# Patient Record
Sex: MALE | Race: ASIAN | NOT HISPANIC OR LATINO | Employment: FULL TIME | URBAN - METROPOLITAN AREA
[De-identification: names, ages, dates, MRNs, and addresses within clinical notes are randomized per-mention and may not be internally consistent; named-entity substitution may affect disease eponyms.]

---

## 2023-09-28 ENCOUNTER — OFFICE VISIT (OUTPATIENT)
Dept: URGENT CARE | Facility: CLINIC | Age: 43
End: 2023-09-28

## 2023-09-28 VITALS
WEIGHT: 147 LBS | OXYGEN SATURATION: 98 % | RESPIRATION RATE: 16 BRPM | TEMPERATURE: 97.1 F | BODY MASS INDEX: 23.63 KG/M2 | SYSTOLIC BLOOD PRESSURE: 112 MMHG | DIASTOLIC BLOOD PRESSURE: 68 MMHG | HEART RATE: 78 BPM | HEIGHT: 66 IN

## 2023-09-28 DIAGNOSIS — T78.40XA ALLERGIC REACTION, INITIAL ENCOUNTER: ICD-10-CM

## 2023-09-28 DIAGNOSIS — R21 RASH AND NONSPECIFIC SKIN ERUPTION: ICD-10-CM

## 2023-09-28 PROCEDURE — 3074F SYST BP LT 130 MM HG: CPT | Performed by: PHYSICIAN ASSISTANT

## 2023-09-28 PROCEDURE — 99203 OFFICE O/P NEW LOW 30 MIN: CPT | Performed by: PHYSICIAN ASSISTANT

## 2023-09-28 PROCEDURE — 3078F DIAST BP <80 MM HG: CPT | Performed by: PHYSICIAN ASSISTANT

## 2023-09-28 RX ORDER — PREDNISONE 20 MG/1
TABLET ORAL
Qty: 5 TABLET | Refills: 0 | Status: SHIPPED | OUTPATIENT
Start: 2023-09-28

## 2023-09-28 RX ORDER — BENAZEPRIL HYDROCHLORIDE 10 MG/1
10 TABLET ORAL DAILY
COMMUNITY

## 2023-09-28 ASSESSMENT — ENCOUNTER SYMPTOMS
STRIDOR: 0
SHORTNESS OF BREATH: 0
FEVER: 0
VOMITING: 0
CHILLS: 0
NAUSEA: 0

## 2023-09-28 NOTE — PROGRESS NOTES
"  Subjective:     Ras Montes De Oca  is a 42 y.o. male who presents for Rash (X1 week Groin rash and back of the body)    Family members assisting with translation.  Rash  Pertinent negatives include no fever, shortness of breath or vomiting.   Patient presents urgent care with family members assisting with translation.  They note that he has had a rash has been present for the last week.  Patient notes intense itching with rash.  States rash is on her groin as well as torso and legs.  Patient states that first began on right side of back.  Denies swelling to oropharynx or throat.  Denies abnormal breathing or coughing.  Denies history of anaphylaxis or angioedema.  They have been treating with Benadryl with moderate improvement but notes rash persists.  They deny new exposures or new medicines.  They state patient works for [employer] but denies anything that is necessarily coming to contact with these areas of his body.  He states he has become congested and had sneezing while at work.    Review of Systems   Constitutional:  Negative for chills and fever.   Respiratory:  Negative for shortness of breath and stridor.    Gastrointestinal:  Negative for nausea and vomiting.   Skin:  Positive for itching and rash.       Medications:    benazepril Tabs    Allergies: Patient has no allergy information on record.    Problem List: Ras Montes De Oca does not have a problem list on file.    Surgical History:  No past surgical history on file.    Past Social Hx: Ras Montes De Oca       Past Family Hx:  Ras Montes De Oca family history is not on file.     Problem list, medications, and allergies reviewed by myself today in Epic.     Objective:   /68 (BP Location: Left arm, Patient Position: Standing, BP Cuff Size: Adult)   Pulse 78   Temp 36.2 °C (97.1 °F)   Resp 16   Ht 1.676 m (5' 6\")   Wt 66.7 kg (147 lb)   SpO2 98%   BMI 23.73 kg/m²     Physical Exam  Vitals and nursing note reviewed.   Constitutional:       General: He is " not in acute distress.     Appearance: Normal appearance. He is well-developed. He is not diaphoretic.   HENT:      Head: Normocephalic and atraumatic.      Right Ear: External ear normal.      Left Ear: External ear normal.      Nose: Nose normal.      Mouth/Throat:      Lips: Pink.      Mouth: Mucous membranes are moist.      Dentition: No gingival swelling.      Pharynx: Oropharynx is clear. Uvula midline. No pharyngeal swelling, oropharyngeal exudate, posterior oropharyngeal erythema or uvula swelling.   Eyes:      General: No scleral icterus.        Right eye: No discharge.         Left eye: No discharge.      Conjunctiva/sclera: Conjunctivae normal.   Pulmonary:      Effort: Pulmonary effort is normal. No respiratory distress.   Musculoskeletal:         General: Normal range of motion.      Cervical back: Neck supple.   Skin:     General: Skin is warm and dry.      Coloration: Skin is not pale.      Findings: Rash present. No erythema. Rash is urticarial. Rash is not scaling or vesicular.   Neurological:      Mental Status: He is alert and oriented to person, place, and time.      Coordination: Coordination normal.         Assessment/Plan:   Assessment      1. Rash and nonspecific skin eruption    2. Allergic reaction, initial encounter  - predniSONE (DELTASONE) 20 MG Tab; Take one tab PO qd x 5d  Dispense: 5 Tablet; Refill: 0    Other orders  - benazepril (LOTENSIN) 10 MG Tab; Take 10 mg by mouth every day.    Supportive care is reviewed with patient/caregiver - recommend to push PO fluids and electrolytes, Cautioned regarding potential side effects of steroid, avoid nsaids while using  Over-the-counter Benadryl and Pepcid  Declines allergist referral at this time  Sent with work note  Return to clinic with lack of resolution or progression of symptoms.  ER precautions with any worsening symptoms are reviewed with patient/caregiver and they do express understanding    I have worn an N95 mask, gloves and eye  protection for the entire encounter with this patient.     Differential diagnosis, natural history, supportive care, and indications for immediate follow-up discussed.

## 2023-09-28 NOTE — LETTER
[facility]  [address] [address]     September 28, 2023    Patient: Ras Montes De Oca   YOB: 1980   Date of Visit: 9/28/2023       To Whom It May Concern:    Ras Montes De Oca was seen and treated in our department on 9/28/2023. He should be excused from missed work for today and tomorrow.     Sincerely,     Brandon Hines P.A.-C.

## 2024-06-22 ENCOUNTER — OFFICE VISIT (OUTPATIENT)
Dept: URGENT CARE | Facility: CLINIC | Age: 44
End: 2024-06-22
Payer: COMMERCIAL

## 2024-06-22 VITALS
BODY MASS INDEX: 23.18 KG/M2 | HEART RATE: 60 BPM | HEIGHT: 67 IN | DIASTOLIC BLOOD PRESSURE: 70 MMHG | SYSTOLIC BLOOD PRESSURE: 102 MMHG | RESPIRATION RATE: 16 BRPM | WEIGHT: 147.71 LBS | OXYGEN SATURATION: 100 % | TEMPERATURE: 98.1 F

## 2024-06-22 DIAGNOSIS — L30.4 INTERTRIGO: ICD-10-CM

## 2024-06-22 PROCEDURE — 99213 OFFICE O/P EST LOW 20 MIN: CPT | Performed by: FAMILY MEDICINE

## 2024-06-22 PROCEDURE — 3074F SYST BP LT 130 MM HG: CPT | Performed by: FAMILY MEDICINE

## 2024-06-22 PROCEDURE — 3078F DIAST BP <80 MM HG: CPT | Performed by: FAMILY MEDICINE

## 2024-06-22 RX ORDER — FLUCONAZOLE 150 MG/1
150 TABLET ORAL
Qty: 4 TABLET | Refills: 1 | Status: SHIPPED | OUTPATIENT
Start: 2024-06-22 | End: 2024-07-22

## 2024-06-22 RX ORDER — TRIAMCINOLONE ACETONIDE 1 MG/G
OINTMENT TOPICAL
Qty: 30 G | Refills: 0 | Status: SHIPPED | OUTPATIENT
Start: 2024-06-22

## 2024-06-22 ASSESSMENT — ENCOUNTER SYMPTOMS
WEIGHT LOSS: 0
EYE REDNESS: 0
VOMITING: 0
NAUSEA: 0
EYE DISCHARGE: 0
MYALGIAS: 0

## 2024-06-22 NOTE — PROGRESS NOTES
"Brennan Montes De Oca is a 43 y.o. male who presents with Rash (X8mo, rash on and off on groin area)            8 months itching rash to groin.  No blisters.  No drainage.  Symptoms have waxed and waned.  No genital lesions.  No oral lesions.  No scalp lesions.  No other aggravating or alleviating factors.        Review of Systems   Constitutional:  Negative for malaise/fatigue and weight loss.   Eyes:  Negative for discharge and redness.   Gastrointestinal:  Negative for nausea and vomiting.   Musculoskeletal:  Negative for joint pain and myalgias.              Objective     /70 (BP Location: Left arm, Patient Position: Sitting, BP Cuff Size: Adult)   Pulse 60   Temp 36.7 °C (98.1 °F) (Temporal)   Resp 16   Ht 1.702 m (5' 7\")   Wt 67 kg (147 lb 11.3 oz)   SpO2 100%   BMI 23.13 kg/m²      Physical Exam  Constitutional:       Appearance: Normal appearance.   Skin:     Findings: Rash (Pruritic plaques to bilateral groin.  Genitalia are spared.  No vesicles.  Few areas appear to have central clearing.  No satellite lesions.  No scale.  No drainage.) present.   Neurological:      Mental Status: He is alert.                             Assessment & Plan        1. Intertrigo    - fluconazole (DIFLUCAN) 150 MG tablet; Take 1 Tablet by mouth every 7 days for 30 days.  Dispense: 4 Tablet; Refill: 1  - terbinafine (LAMISIL) 1 % cream; Apply to affected area twice daily until clear  Dispense: 28.4 g; Refill: 0  - triamcinolone acetonide (KENALOG) 0.1 % Ointment; Apply thin layer to affected area twice daily as needed  Dispense: 30 g; Refill: 0    Differential diagnosis, natural history, supportive care, and indications for immediate follow-up were discussed.                   "

## 2024-06-22 NOTE — LETTER
June 22, 2024         Patient: Ras Montes De Oca   YOB: 1980   Date of Visit: 6/22/2024           To Whom it May Concern:    Ras Montes De Oca was seen in my clinic on 6/22/2024. Please excuse from work 6/24/2024.     Sincerely,           Bhavin Estrada M.D.  Electronically Signed

## 2024-08-07 ENCOUNTER — OFFICE VISIT (OUTPATIENT)
Dept: MEDICAL GROUP | Facility: PHYSICIAN GROUP | Age: 44
End: 2024-08-07
Payer: COMMERCIAL

## 2024-08-07 ENCOUNTER — HOSPITAL ENCOUNTER (OUTPATIENT)
Facility: MEDICAL CENTER | Age: 44
End: 2024-08-07
Attending: FAMILY MEDICINE
Payer: COMMERCIAL

## 2024-08-07 VITALS
DIASTOLIC BLOOD PRESSURE: 64 MMHG | WEIGHT: 146 LBS | TEMPERATURE: 98.1 F | OXYGEN SATURATION: 98 % | SYSTOLIC BLOOD PRESSURE: 102 MMHG | RESPIRATION RATE: 18 BRPM | BODY MASS INDEX: 22.91 KG/M2 | HEIGHT: 67 IN | HEART RATE: 56 BPM

## 2024-08-07 DIAGNOSIS — L30.4 INTERTRIGO: ICD-10-CM

## 2024-08-07 DIAGNOSIS — R31.29 OTHER MICROSCOPIC HEMATURIA: ICD-10-CM

## 2024-08-07 DIAGNOSIS — M89.8X1 PAIN OF LEFT SCAPULA: ICD-10-CM

## 2024-08-07 DIAGNOSIS — Z00.00 WELLNESS EXAMINATION: ICD-10-CM

## 2024-08-07 DIAGNOSIS — R35.0 FREQUENT URINATION: ICD-10-CM

## 2024-08-07 DIAGNOSIS — M54.6 ACUTE LEFT-SIDED THORACIC BACK PAIN: ICD-10-CM

## 2024-08-07 DIAGNOSIS — R20.2 PARESTHESIAS: ICD-10-CM

## 2024-08-07 LAB
APPEARANCE UR: CLEAR
APPEARANCE UR: CLEAR
BILIRUB UR QL STRIP.AUTO: NEGATIVE
BILIRUB UR STRIP-MCNC: NEGATIVE MG/DL
COLOR UR AUTO: YELLOW
COLOR UR: YELLOW
GLUCOSE UR STRIP.AUTO-MCNC: NEGATIVE MG/DL
GLUCOSE UR STRIP.AUTO-MCNC: NEGATIVE MG/DL
KETONES UR STRIP.AUTO-MCNC: NEGATIVE MG/DL
KETONES UR STRIP.AUTO-MCNC: NEGATIVE MG/DL
LEUKOCYTE ESTERASE UR QL STRIP.AUTO: NEGATIVE
LEUKOCYTE ESTERASE UR QL STRIP.AUTO: NEGATIVE
MICRO URNS: NORMAL
NITRITE UR QL STRIP.AUTO: NEGATIVE
NITRITE UR QL STRIP.AUTO: NEGATIVE
PH UR STRIP.AUTO: 6.5 [PH] (ref 5–8)
PH UR STRIP.AUTO: 6.5 [PH] (ref 5–8)
PROT UR QL STRIP: NEGATIVE MG/DL
PROT UR QL STRIP: NEGATIVE MG/DL
RBC UR QL AUTO: NEGATIVE
RBC UR QL AUTO: NORMAL
SP GR UR STRIP.AUTO: 1
SP GR UR STRIP.AUTO: 1.01
UROBILINOGEN UR STRIP-MCNC: NORMAL MG/DL
UROBILINOGEN UR STRIP.AUTO-MCNC: 0.2 MG/DL

## 2024-08-07 PROCEDURE — 99214 OFFICE O/P EST MOD 30 MIN: CPT | Performed by: FAMILY MEDICINE

## 2024-08-07 PROCEDURE — 3074F SYST BP LT 130 MM HG: CPT | Performed by: FAMILY MEDICINE

## 2024-08-07 PROCEDURE — 3078F DIAST BP <80 MM HG: CPT | Performed by: FAMILY MEDICINE

## 2024-08-07 PROCEDURE — 81003 URINALYSIS AUTO W/O SCOPE: CPT

## 2024-08-07 PROCEDURE — 81002 URINALYSIS NONAUTO W/O SCOPE: CPT | Performed by: FAMILY MEDICINE

## 2024-08-07 PROCEDURE — 87086 URINE CULTURE/COLONY COUNT: CPT

## 2024-08-07 RX ORDER — TRIAMCINOLONE ACETONIDE 1 MG/G
OINTMENT TOPICAL
Qty: 30 G | Refills: 0 | Status: SHIPPED | OUTPATIENT
Start: 2024-08-07

## 2024-08-07 RX ORDER — MELOXICAM 15 MG/1
15 TABLET ORAL DAILY
Qty: 90 TABLET | Refills: 0 | Status: SHIPPED | OUTPATIENT
Start: 2024-08-07

## 2024-08-07 ASSESSMENT — ENCOUNTER SYMPTOMS
NAUSEA: 0
PALPITATIONS: 0
ABDOMINAL PAIN: 0
SHORTNESS OF BREATH: 0
VOMITING: 0

## 2024-08-07 ASSESSMENT — PATIENT HEALTH QUESTIONNAIRE - PHQ9: CLINICAL INTERPRETATION OF PHQ2 SCORE: 0

## 2024-08-07 NOTE — PROGRESS NOTES
"Subjective:     Verbal consent was acquired by the patient to use [employer] ambient listening note generation during this visit      CC:   Chief Complaint   Patient presents with    Establish Care    Requesting Labs     Pt would like to check for diabetes, pt with tingling sensation of both arms and legs and frequent urination  x 1 mo.  Fam HX: Diabetes    Back Pain     Lt. Upper mid back pain x 1 mo.  Pain worsens when lifts heavy objects.         History of Present Illness  The patient presents for evaluation of back pain and increased urination. He is accompanied by an adult female.    He suspects he may have diabetes due to a family history of diabetes. He reports increased urination and numbness and tingling in his feet and hands for the past month. He denies any sudden weight changes. He also experiences back pain, particularly on the left side, when carrying heavy objects, which extends to his left shoulder. This back pain has been present for approximately one to two months. The pain is most noticeable when he is lying in bed, but manageable when he is at work. His daughter has been massaging the area, but he has not used ice or heating pads. He has not taken Tylenol or ibuprofen. The pain occasionally radiates down his arms, disrupting his sleep. He has no history of surgeries. He denies any recent weight changes.    He has a history of allergies, for which he received medication at an urgent care facility eight months ago and would like it refilled    FAMILY HISTORY  He has a family history of diabetes.       ROS:  Review of Systems   Respiratory:  Negative for shortness of breath.    Cardiovascular:  Negative for chest pain and palpitations.   Gastrointestinal:  Negative for abdominal pain, nausea and vomiting.       Objective:     Exam:  /64 (BP Location: Left arm, Patient Position: Sitting, BP Cuff Size: Adult)   Pulse (!) 56   Temp 36.7 °C (98.1 °F) (Temporal)   Resp 18   Ht 1.702 m (5' 7\") "   Wt 66.2 kg (146 lb)   SpO2 98%   BMI 22.87 kg/m²  Body mass index is 22.87 kg/m².    Physical Exam  Constitutional:       Appearance: Normal appearance.   HENT:      Head: Normocephalic and atraumatic.      Mouth/Throat:      Mouth: Mucous membranes are moist.   Eyes:      Extraocular Movements: Extraocular movements intact.      Conjunctiva/sclera: Conjunctivae normal.      Pupils: Pupils are equal, round, and reactive to light.   Cardiovascular:      Rate and Rhythm: Normal rate and regular rhythm.      Heart sounds: No murmur heard.  Pulmonary:      Effort: Pulmonary effort is normal.      Breath sounds: Normal breath sounds. No wheezing.   Abdominal:      General: Abdomen is flat. Bowel sounds are normal.      Palpations: Abdomen is soft.   Musculoskeletal:      Left shoulder: No tenderness or crepitus. Normal range of motion. Normal strength.      Cervical back: Neck supple. No spasms or tenderness. No pain with movement. Normal range of motion.      Thoracic back: Spasms and tenderness present. Normal range of motion.        Back:    Skin:     General: Skin is warm and dry.   Neurological:      General: No focal deficit present.      Mental Status: He is alert and oriented to person, place, and time.   Psychiatric:         Mood and Affect: Mood normal.             Assessment & Plan:     43 y.o. male with the following -    1. Frequent urination  7. Other microscopic hematuria  - POCT Urinalysis  - PROSTATE SPECIFIC AG SCREENING; Future  - URINALYSIS; Future  - URINE CULTURE(NEW); Future    Acute  Etiology unclear as there is no signs of infection or elevated glucose levels and ensuring that he does not have some microscopic hematuria  Will order additional testing and then depending on those results may repeat in-house UA at his next visit in 6 weeks    2. Paresthesias  - TSH WITH REFLEX TO FT4; Future  - VITAMIN B12; Future  - Monoclonal Protein Study; Future  - DX-CERVICAL SPINE-2 OR 3 VIEWS; Future  -  DX-THORACIC SPINE-2 VIEWS; Future  - DX-SCAPULA LEFT; Future  - meloxicam (MOBIC) 15 MG tablet; Take 1 Tablet by mouth every day.  Dispense: 90 Tablet; Refill: 0  - Referral to Physical Therapy    Acute  Etiology unclear  Will order lab work and imaging    3. Acute left-sided thoracic back pain  4. Pain of left scapula  - DX-CERVICAL SPINE-2 OR 3 VIEWS; Future  - DX-THORACIC SPINE-2 VIEWS; Future  - DX-SCAPULA LEFT; Future  - meloxicam (MOBIC) 15 MG tablet; Take 1 Tablet by mouth every day.  Dispense: 90 Tablet; Refill: 0  - Referral to Physical Therapy  - tizanidine (ZANAFLEX) 4 MG Tab; Take 1 Tablet by mouth every 6 hours as needed (pain).  Dispense: 30 Tablet; Refill: 3    Acute  Etiology unclear but suspect possible osteoarthritis in either of his cervical spine or thoracic spine  Will order x-rays and placed physical therapy referral  Will start patient on meloxicam daily and tizanidine nightly as needed for pain    5. Wellness examination  - Lipid Profile; Future  - INSULIN FASTING; Future  - TSH WITH REFLEX TO FT4; Future  - CBC WITH DIFFERENTIAL; Future  - Comp Metabolic Panel; Future  - HEMOGLOBIN A1C; Future  - PROSTATE SPECIFIC AG SCREENING; Future  - HEP B SURFACE AB; Future  - HEP C VIRUS ANTIBODY; Future  - HIV AG/AB COMBO ASSAY SCREENING; Future    Patient due  Will order lab work and bring patient back at a later date for an annual exam    6. Intertrigo  - triamcinolone acetonide (KENALOG) 0.1 % Ointment; Apply thin layer to affected area twice daily as needed  Dispense: 30 g; Refill: 0    Patient requesting refill  -will send today        Return in about 6 weeks (around 9/18/2024) for Lab F/U.      I personally reviewed and updated and reviewed the patient's personal, social, family and surgical history at today's appointment.     Please note that this dictation was created using voice recognition software. I have made every reasonable attempt to correct obvious errors, but I expect that there are  errors of grammar and possibly content that I did not discover before finalizing the note.

## 2024-08-07 NOTE — LETTER
[facility]  Nadia Gardiner M.D.  [address]  [place] [address]  Fax: [phone]   Authorization for Release/Disclosure of   Protected Health Information   Name: RAS BRADLEY : 1980 SSN: [id number]   Address: [facility] [address]  [place] [address] Phone:    [phone] (home)    I authorize the entity listed below to release/disclose the PHI below to:   [facility]/Nadia Gardiner M.D. and Nadia Gardiner M.D.   Provider or Entity Name:     Address   City, State, Zip   Phone:    Fax:   Reason for request: continuity of care   Information to be released:    [  ] LAST COLONOSCOPY,  including any PATH REPORT and follow-up  [  ] LAST FIT/COLOGUARD RESULT [  ] LAST DEXA  [  ] LAST MAMMOGRAM  [  ] LAST PAP  [  ] LAST LABS [  ] RETINA EXAM REPORT  [  ] IMMUNIZATION RECORDS  [  ] Release all info      [  ] Check here and initial the line next to each item to release ALL health information INCLUDING  _____ Care and treatment for drug and / or alcohol abuse  _____ HIV testing, infection status, or AIDS  _____ Genetic Testing    DATES OF SERVICE OR TIME PERIOD TO BE DISCLOSED: _____________  I understand and acknowledge that:  * This Authorization may be revoked at any time by you in writing, except if your health information has already been used or disclosed.  * Your health information that will be used or disclosed as a result of you signing this authorization could be re-disclosed by the recipient. If this occurs, your re-disclosed health information may no longer be protected by State or Federal laws.  * You may refuse to sign this Authorization. Your refusal will not affect your ability to obtain treatment.  * This Authorization becomes effective upon signing and will  on (date) __________.      If no date is indicated, this Authorization will  one (1) year from the signature date.    Name: Ras Bradley  Signature: Date:        PLEASE FAX REQUESTED RECORDS BACK TO: [phone]  [phone]

## 2024-08-07 NOTE — LETTER
[address]  [facility]  [address]  [address]  [place] [address]     August 7, 2024    Patient: Ras Montes De Oca   YOB: 1980   Date of Visit: 8/7/2024       To Whom It May Concern:    Ras Montes De Oca was seen and treated in our department on 8/7/2024.     Sincerely,     Nadia Gardiner M.D.

## 2024-08-09 LAB
BACTERIA UR CULT: NORMAL
SIGNIFICANT IND 70042: NORMAL
SITE SITE: NORMAL
SOURCE SOURCE: NORMAL

## 2024-08-14 ENCOUNTER — TELEPHONE (OUTPATIENT)
Dept: MEDICAL GROUP | Facility: PHYSICIAN GROUP | Age: 44
End: 2024-08-14
Payer: COMMERCIAL

## 2024-08-14 NOTE — TELEPHONE ENCOUNTER
----- Message from Physician Nadia Gardiner M.D. sent at 8/8/2024 12:48 PM PDT -----  Can we let him know that his additional testing was normal and that the follow up urine did not show any blood in his urine.

## 2024-08-14 NOTE — TELEPHONE ENCOUNTER
Nadia Gardiner M.D.  [facility] [address] [facility]  Can we let him know that his additional testing was normal and that the follow up urine did not show any blood in his urine.   Associated Results    URINE CULTURE(NEW)  Order: [id number]   Status: Final result       Visible to patient: No (inaccessible in MyChart)       Dx: Frequent urination; Other microscopic...    Specimen Information: Urine, Clean Catch   1 Result Note      Component 7 d ago   Significant Indicator NEG   Source UR   Site URINE, CLEAN CATCH   Culture Result No growth at 48 hours.   Resulting Agency [facility]              Specimen Collected: 08/07/24  7:32 AM Last Resulted: 08/09/24 11:42 AM        Lab Flowsheet        Order Details        View Encounter        Lab and Collection Details        Routing        Result History     View All Conversations on this Encounter           Result Care Coordination      Result Notes     Nadia Gardiner M.D.  8/8/2024 12:48 PM PDT Back to Top      Can we let him know that his additional testing was normal and that the follow up urine did not show any blood in his urine.     Patient Communication

## 2024-08-17 NOTE — TELEPHONE ENCOUNTER
Phone Number Called: [phone] (home)     Call outcome: Spoke to patient regarding message below.    Message: Relayed results to pt - pt has limited English, relayed results in Tagalog. Pt is aware. Closing enc

## 2024-10-01 ENCOUNTER — OFFICE VISIT (OUTPATIENT)
Dept: URGENT CARE | Facility: CLINIC | Age: 44
End: 2024-10-01
Payer: COMMERCIAL

## 2024-10-01 VITALS
OXYGEN SATURATION: 100 % | SYSTOLIC BLOOD PRESSURE: 115 MMHG | RESPIRATION RATE: 16 BRPM | TEMPERATURE: 97.7 F | WEIGHT: 149.8 LBS | HEIGHT: 67 IN | HEART RATE: 67 BPM | BODY MASS INDEX: 23.51 KG/M2 | DIASTOLIC BLOOD PRESSURE: 70 MMHG

## 2024-10-01 DIAGNOSIS — S66.911A STRAIN OF RIGHT WRIST, INITIAL ENCOUNTER: ICD-10-CM

## 2024-10-01 DIAGNOSIS — M77.8 TENDONITIS OF WRIST, RIGHT: Primary | ICD-10-CM

## 2024-10-01 PROCEDURE — 99214 OFFICE O/P EST MOD 30 MIN: CPT | Performed by: PHYSICIAN ASSISTANT

## 2024-10-01 PROCEDURE — 3078F DIAST BP <80 MM HG: CPT | Performed by: PHYSICIAN ASSISTANT

## 2024-10-01 PROCEDURE — 3074F SYST BP LT 130 MM HG: CPT | Performed by: PHYSICIAN ASSISTANT

## 2024-10-01 RX ORDER — METHYLPREDNISOLONE 4 MG/1
TABLET ORAL
Qty: 21 TABLET | Refills: 0 | Status: SHIPPED | OUTPATIENT
Start: 2024-10-01

## 2024-10-01 ASSESSMENT — ENCOUNTER SYMPTOMS
NUMBNESS: 0
TINGLING: 0
JOINT SWELLING: 1
ARTHRALGIAS: 1
SENSORY CHANGE: 0

## 2024-10-02 ENCOUNTER — OCCUPATIONAL MEDICINE (OUTPATIENT)
Dept: URGENT CARE | Facility: CLINIC | Age: 44
End: 2024-10-02
Payer: COMMERCIAL

## 2024-10-02 VITALS
HEART RATE: 66 BPM | TEMPERATURE: 98.3 F | WEIGHT: 149 LBS | BODY MASS INDEX: 23.39 KG/M2 | HEIGHT: 67 IN | DIASTOLIC BLOOD PRESSURE: 74 MMHG | RESPIRATION RATE: 14 BRPM | SYSTOLIC BLOOD PRESSURE: 122 MMHG | OXYGEN SATURATION: 98 %

## 2024-10-02 DIAGNOSIS — M77.8 TENDONITIS OF WRIST, RIGHT: ICD-10-CM

## 2024-10-02 DIAGNOSIS — S66.911A STRAIN OF RIGHT WRIST, INITIAL ENCOUNTER: ICD-10-CM

## 2024-10-02 PROCEDURE — 99213 OFFICE O/P EST LOW 20 MIN: CPT | Performed by: PHYSICIAN ASSISTANT

## 2024-10-02 PROCEDURE — 3074F SYST BP LT 130 MM HG: CPT | Performed by: PHYSICIAN ASSISTANT

## 2024-10-02 PROCEDURE — 3078F DIAST BP <80 MM HG: CPT | Performed by: PHYSICIAN ASSISTANT

## 2024-10-05 ASSESSMENT — ENCOUNTER SYMPTOMS
SENSORY CHANGE: 0
FOCAL WEAKNESS: 0
TINGLING: 0

## 2024-10-06 ENCOUNTER — OCCUPATIONAL MEDICINE (OUTPATIENT)
Dept: URGENT CARE | Facility: CLINIC | Age: 44
End: 2024-10-06
Payer: COMMERCIAL

## 2024-10-06 VITALS
BODY MASS INDEX: 23.34 KG/M2 | OXYGEN SATURATION: 98 % | SYSTOLIC BLOOD PRESSURE: 110 MMHG | DIASTOLIC BLOOD PRESSURE: 60 MMHG | WEIGHT: 149 LBS | HEART RATE: 78 BPM | TEMPERATURE: 98.4 F | RESPIRATION RATE: 16 BRPM

## 2024-10-06 DIAGNOSIS — S66.911D STRAIN OF RIGHT WRIST, SUBSEQUENT ENCOUNTER: ICD-10-CM
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
## 2024-10-06 PROCEDURE — 99215 OFFICE O/P EST HI 40 MIN: CPT

## 2024-10-06 PROCEDURE — 3074F SYST BP LT 130 MM HG: CPT

## 2024-10-06 PROCEDURE — 3078F DIAST BP <80 MM HG: CPT

## 2024-10-11 ENCOUNTER — OCCUPATIONAL MEDICINE (OUTPATIENT)
Dept: URGENT CARE | Facility: CLINIC | Age: 44
End: 2024-10-11
Payer: COMMERCIAL

## 2024-10-11 VITALS
RESPIRATION RATE: 14 BRPM | WEIGHT: 149 LBS | SYSTOLIC BLOOD PRESSURE: 122 MMHG | BODY MASS INDEX: 23.39 KG/M2 | TEMPERATURE: 98.2 F | DIASTOLIC BLOOD PRESSURE: 74 MMHG | HEART RATE: 56 BPM | OXYGEN SATURATION: 98 % | HEIGHT: 67 IN

## 2024-10-11 DIAGNOSIS — S66.911D STRAIN OF RIGHT WRIST, SUBSEQUENT ENCOUNTER: ICD-10-CM

## 2024-10-11 PROCEDURE — 3078F DIAST BP <80 MM HG: CPT | Performed by: FAMILY MEDICINE

## 2024-10-11 PROCEDURE — 99213 OFFICE O/P EST LOW 20 MIN: CPT | Performed by: FAMILY MEDICINE

## 2024-10-11 PROCEDURE — 3074F SYST BP LT 130 MM HG: CPT | Performed by: FAMILY MEDICINE

## 2024-11-02 DIAGNOSIS — M54.6 ACUTE LEFT-SIDED THORACIC BACK PAIN: ICD-10-CM

## 2024-11-02 DIAGNOSIS — M89.8X1 PAIN OF LEFT SCAPULA: ICD-10-CM

## 2024-11-02 DIAGNOSIS — R20.2 PARESTHESIAS: ICD-10-CM

## 2024-11-04 RX ORDER — MELOXICAM 15 MG/1
15 TABLET ORAL DAILY
Qty: 90 TABLET | Refills: 0 | Status: SHIPPED | OUTPATIENT
Start: 2024-11-04

## 2024-11-04 NOTE — TELEPHONE ENCOUNTER
Received request via: Pharmacy    Was the patient seen in the last year in this department? Yes    Does the patient have an active prescription (recently filled or refills available) for medication(s) requested? No    Pharmacy Name:  [facility]     Does the patient have snf Plus and need 100-day supply? (This applies to ALL medications) Patient does not have SCP

## 2024-12-01 ENCOUNTER — OFFICE VISIT (OUTPATIENT)
Dept: URGENT CARE | Facility: CLINIC | Age: 44
End: 2024-12-01
Payer: COMMERCIAL

## 2024-12-01 ENCOUNTER — HOSPITAL ENCOUNTER (OUTPATIENT)
Dept: RADIOLOGY | Facility: MEDICAL CENTER | Age: 44
End: 2024-12-01
Attending: PHYSICIAN ASSISTANT
Payer: COMMERCIAL

## 2024-12-01 VITALS
DIASTOLIC BLOOD PRESSURE: 70 MMHG | HEIGHT: 66 IN | WEIGHT: 152 LBS | TEMPERATURE: 97.4 F | SYSTOLIC BLOOD PRESSURE: 110 MMHG | BODY MASS INDEX: 24.43 KG/M2 | RESPIRATION RATE: 16 BRPM | HEART RATE: 68 BPM | OXYGEN SATURATION: 100 %

## 2024-12-01 DIAGNOSIS — R10.30 LOWER ABDOMINAL PAIN: ICD-10-CM

## 2024-12-01 LAB
APPEARANCE UR: CLEAR
BILIRUB UR STRIP-MCNC: NEGATIVE MG/DL
COLOR UR AUTO: YELLOW
GLUCOSE UR STRIP.AUTO-MCNC: NEGATIVE MG/DL
KETONES UR STRIP.AUTO-MCNC: NEGATIVE MG/DL
LEUKOCYTE ESTERASE UR QL STRIP.AUTO: NEGATIVE
NITRITE UR QL STRIP.AUTO: NEGATIVE
PH UR STRIP.AUTO: 5.5 [PH] (ref 5–8)
PROT UR QL STRIP: NEGATIVE MG/DL
RBC UR QL AUTO: NORMAL
SP GR UR STRIP.AUTO: 1.01
UROBILINOGEN UR STRIP-MCNC: 0.2 MG/DL

## 2024-12-01 PROCEDURE — 74177 CT ABD & PELVIS W/CONTRAST: CPT

## 2024-12-01 PROCEDURE — 700117 HCHG RX CONTRAST REV CODE 255: Performed by: PHYSICIAN ASSISTANT

## 2024-12-01 PROCEDURE — 3078F DIAST BP <80 MM HG: CPT | Performed by: PHYSICIAN ASSISTANT

## 2024-12-01 PROCEDURE — 99214 OFFICE O/P EST MOD 30 MIN: CPT | Performed by: PHYSICIAN ASSISTANT

## 2024-12-01 PROCEDURE — 3074F SYST BP LT 130 MM HG: CPT | Performed by: PHYSICIAN ASSISTANT

## 2024-12-01 PROCEDURE — 81002 URINALYSIS NONAUTO W/O SCOPE: CPT | Performed by: PHYSICIAN ASSISTANT

## 2024-12-01 RX ADMIN — IOHEXOL 25 ML: 240 INJECTION, SOLUTION INTRATHECAL; INTRAVASCULAR; INTRAVENOUS; ORAL at 18:25

## 2024-12-01 RX ADMIN — IOHEXOL 100 ML: 350 INJECTION, SOLUTION INTRAVENOUS at 18:19

## 2024-12-01 ASSESSMENT — ENCOUNTER SYMPTOMS
NAUSEA: 0
BACK PAIN: 1
CHILLS: 0
VOMITING: 0
DIARRHEA: 0
ABDOMINAL PAIN: 1
FEVER: 0

## 2024-12-01 NOTE — PROGRESS NOTES
"Subjective     Ras Montes De Oca is a 44 y.o. male who presents with Low Back Pain (Low back pain, abdominal pain, difficulty sleeping x 5 days)    HPI:  Ras Montes De Oca is a 44 y.o. male who presents today for evaluation of lower abdominal pain.  He says that he has had pain for almost 1 week.  He reports pretty persistent 8/10 lower abdominal pain back.  He denies any testicular pain or swelling, abnormal penile discharge.  Symptoms have been normal.  No fever/chills or nausea/vomiting.  Says that the discomfort is keeping him up at night.        Review of Systems   Constitutional:  Negative for chills and fever.   Gastrointestinal:  Positive for abdominal pain. Negative for diarrhea, nausea and vomiting.   Genitourinary:  Negative for dysuria, hematuria and urgency.   Musculoskeletal:  Positive for back pain.           PMH:  has no past medical history on file.  MEDS:   Current Outpatient Medications:     meloxicam (MOBIC) 15 MG tablet, TAKE 1 TABLET BY MOUTH EVERY DAY (Patient not taking: Reported on 12/1/2024), Disp: 90 Tablet, Rfl: 0    tizanidine (ZANAFLEX) 4 MG Tab, Take 1 Tablet by mouth every 6 hours as needed (pain). (Patient not taking: Reported on 12/1/2024), Disp: 30 Tablet, Rfl: 3  ALLERGIES: No Known Allergies  SURGHX: History reviewed. No pertinent surgical history.  SOCHX:  reports that he has never smoked. He has never used smokeless tobacco. He reports that he does not currently use alcohol. He reports that he does not use drugs.  FH: Family history was reviewed, no pertinent findings to report      Objective     /70 (BP Location: Left arm, Patient Position: Sitting, BP Cuff Size: Adult)   Pulse 68   Temp 36.3 °C (97.4 °F) (Temporal)   Resp 16   Ht 1.676 m (5' 6\")   Wt 68.9 kg (152 lb)   SpO2 100%   BMI 24.53 kg/m²      Physical Exam  Constitutional:       General: He is not in acute distress.     Appearance: He is not diaphoretic.   HENT:      Head: Normocephalic and " atraumatic.      Right Ear: External ear normal.      Left Ear: External ear normal.   Eyes:      Conjunctiva/sclera: Conjunctivae normal.      Pupils: Pupils are equal, round, and reactive to light.   Pulmonary:      Effort: Pulmonary effort is normal. No respiratory distress.   Abdominal:      General: Bowel sounds are normal.      Palpations: Abdomen is soft.      Tenderness: There is abdominal tenderness in the right lower quadrant and suprapubic area. There is no right CVA tenderness, left CVA tenderness, guarding or rebound. Positive signs include McBurney's sign.      Hernia: No hernia is present.   Musculoskeletal:      Cervical back: Normal range of motion.   Skin:     Findings: No rash.   Neurological:      Mental Status: He is alert and oriented to person, place, and time.   Psychiatric:         Mood and Affect: Mood and affect normal.         Cognition and Memory: Memory normal.         Judgment: Judgment normal.       POCT Urinalysis  Lab Results   Component Value Date/Time    POCCOLOR yellow 12/01/2024 12:20 PM    POCAPPEAR clear 12/01/2024 12:20 PM    POCLEUKEST negative 12/01/2024 12:20 PM    POCNITRITE negative 12/01/2024 12:20 PM    POCUROBILIGE 0.2 12/01/2024 12:20 PM    POCPROTEIN negative 12/01/2024 12:20 PM    POCURPH 5.5 12/01/2024 12:20 PM    POCBLOOD trace-lysed 12/01/2024 12:20 PM    POCSPGRV 1.015 12/01/2024 12:20 PM    POCKETONES negative 12/01/2024 12:20 PM    POCBILIRUBIN negative 12/01/2024 12:20 PM    POCGLUCUA negative 12/01/2024 12:20 PM              Assessment & Plan     1. Lower abdominal pain  - POCT Urinalysis  - CT-ABDOMEN-PELVIS WITH; Future  Patient presenting for evaluation of persistent moderate to severe lower abdominal pain and associated low back pain for the past 4 to 7 days.  On exam he was tender in his lower abdomen with marked tenderness in the right lower quadrant.  Vital signs stable.  No obvious evidence of hernia.  No signs of urinary tract infection on the  urinalysis.  I have ordered a CT scan of his abdomen and pelvis with contrast to further evaluate his symptoms and to rule out more serious causes such as appendicitis.  CT scan is scheduled for tonight at 6 PM.  Discussed that I will be off when the results come through.  If there is anything significant that is seen I will notify them of results tonight.  Otherwise, I will call them tomorrow morning when I get back into the office.        Differential Diagnosis, natural history, and supportive care discussed. Return to the Urgent Care or follow up with your PCP if symptoms fail to resolve, or for any new or worsening symptoms. Emergency room precautions discussed. Patient and/or family appears understanding of information.

## 2024-12-02 ENCOUNTER — OFFICE VISIT (OUTPATIENT)
Dept: URGENT CARE | Facility: CLINIC | Age: 44
End: 2024-12-02
Payer: COMMERCIAL

## 2024-12-02 DIAGNOSIS — M54.50 ACUTE BILATERAL LOW BACK PAIN WITHOUT SCIATICA: ICD-10-CM

## 2024-12-02 RX ORDER — CYCLOBENZAPRINE HCL 10 MG
10 TABLET ORAL
Qty: 10 TABLET | Refills: 0 | Status: SHIPPED | OUTPATIENT
Start: 2024-12-02

## 2024-12-02 NOTE — PROGRESS NOTES
[employer] solutions was used for this phone call.  Language: Tagalog.   ID number [id number]    Spoke with patient regarding CT results. He has been noting 1 week of lower abdominal pain, some low back pain as well.  CT scan was completely within normal limits.  No acute inflammatory or infectious process noted.  No hydronephrosis.  Urinalysis yesterday did not suggest any signs of infection.  Unclear etiology of his symptoms.  Recommend use of OTC analgesics, applications of heating pads to the areas.  He should try to drink plenty of water and eat foods high in fiber to prevent constipation.  If symptoms worsen he will go to the emergency department.  If symptoms do not worsen but fail to improve after 1 week he can return for reevaluation or see his regular provider.  I did send over a few tablets of cyclobenzaprine for him to use at nighttime to help him sleep.  He has a previous prescription for tizanidine.  He should not use those together.

## 2024-12-02 NOTE — LETTER
December 2, 2024         Patient: Ras Montes De Oca   YOB: 1980   Date of Visit: 12/2/2024           To Whom it May Concern:    Ras Montes De Oca was seen in my clinic on 12/1/2024. Please excuse his absence from work on 12/2/2024.        Sincerely,           Juliet Lomeli P.A.-C.  Electronically Signed

## 2025-04-24 ENCOUNTER — OFFICE VISIT (OUTPATIENT)
Dept: URGENT CARE | Facility: CLINIC | Age: 45
End: 2025-04-24
Payer: COMMERCIAL

## 2025-04-24 VITALS
OXYGEN SATURATION: 98 % | WEIGHT: 168 LBS | RESPIRATION RATE: 16 BRPM | HEIGHT: 66 IN | HEART RATE: 68 BPM | SYSTOLIC BLOOD PRESSURE: 104 MMHG | TEMPERATURE: 97.6 F | DIASTOLIC BLOOD PRESSURE: 70 MMHG | BODY MASS INDEX: 27 KG/M2

## 2025-04-24 DIAGNOSIS — M10.9 ACUTE GOUT OF RIGHT FOOT, UNSPECIFIED CAUSE: ICD-10-CM

## 2025-04-24 PROCEDURE — 99213 OFFICE O/P EST LOW 20 MIN: CPT

## 2025-04-24 PROCEDURE — 3074F SYST BP LT 130 MM HG: CPT

## 2025-04-24 PROCEDURE — 3078F DIAST BP <80 MM HG: CPT

## 2025-04-24 RX ORDER — PREDNISONE 20 MG/1
40 TABLET ORAL DAILY
Qty: 10 TABLET | Refills: 0 | Status: SHIPPED | OUTPATIENT
Start: 2025-04-24 | End: 2025-04-29

## 2025-04-24 ASSESSMENT — ENCOUNTER SYMPTOMS: FEVER: 0

## 2025-04-24 NOTE — PROGRESS NOTES
Verbal consent was acquired by the patient to use [employer] ambient listening note generation during this visit   Subjective:   Ras Montes De Oca is a 44 y.o. male who presents for Gout (Gout attack ,right foot x 4 days )      HPI:  History of Present Illness  The patient is a 44-year-old male who presents for evaluation of gout in his right foot, accompanied by a family member.    He reports pain to his right foot x4 days. A history of gout is documented. Pain is reported during ambulation, but crutches are not required for mobility. No systemic symptoms such as fevers, chills, or body aches are present. The usual treatment regimen includes naproxen, which was taken earlier today.        Review of Systems   Constitutional:  Negative for fever.   Musculoskeletal:         +right foot pain       Medications:    Current Outpatient Medications on File Prior to Visit   Medication Sig Dispense Refill    cyclobenzaprine (FLEXERIL) 10 mg Tab Take 1 Tablet by mouth at bedtime as needed for Moderate Pain (back pain). (Patient not taking: Reported on 4/24/2025) 10 Tablet 0    meloxicam (MOBIC) 15 MG tablet TAKE 1 TABLET BY MOUTH EVERY DAY (Patient not taking: Reported on 4/24/2025) 90 Tablet 0    tizanidine (ZANAFLEX) 4 MG Tab Take 1 Tablet by mouth every 6 hours as needed (pain). (Patient not taking: Reported on 4/24/2025) 30 Tablet 3     No current facility-administered medications on file prior to visit.        Allergies:   Patient has no known allergies.    Problem List:   There is no problem list on file for this patient.     Surgical History:  No past surgical history on file.    Past Social Hx:   Social History     Tobacco Use    Smoking status: Never    Smokeless tobacco: Never   Vaping Use    Vaping status: Never Used   Substance Use Topics    Alcohol use: Not Currently     Comment: occ    Drug use: Never          Problem list, medications, and allergies reviewed by myself today in Epic.     Objective:     BP  "104/70 (BP Location: Left arm, Patient Position: Sitting, BP Cuff Size: Adult)   Pulse 68   Temp 36.4 °C (97.6 °F) (Temporal)   Resp 16   Ht 1.676 m (5' 6\")   Wt 76.2 kg (168 lb)   SpO2 98%   BMI 27.12 kg/m²     Physical Exam  Vitals and nursing note reviewed.   Constitutional:       General: He is not in acute distress.     Appearance: Normal appearance. He is normal weight. He is not ill-appearing, toxic-appearing or diaphoretic.   HENT:      Head: Normocephalic and atraumatic.      Right Ear: Tympanic membrane normal.   Cardiovascular:      Rate and Rhythm: Normal rate and regular rhythm.      Pulses: Normal pulses.      Heart sounds: Normal heart sounds.   Pulmonary:      Effort: Pulmonary effort is normal. No respiratory distress.      Breath sounds: Normal breath sounds. No stridor. No wheezing, rhonchi or rales.   Chest:      Chest wall: No tenderness.   Musculoskeletal:      Cervical back: Normal range of motion and neck supple. No tenderness.        Feet:    Feet:      Comments: Right foot: Tenderness palpate to the dorsal aspect of foot. No erythema  Skin:     General: Skin is warm and dry.      Capillary Refill: Capillary refill takes less than 2 seconds.   Neurological:      General: No focal deficit present.      Mental Status: He is alert and oriented to person, place, and time. Mental status is at baseline.      Comments: +antalgic gait   Psychiatric:         Mood and Affect: Mood normal.         Behavior: Behavior normal.         Thought Content: Thought content normal.         Judgment: Judgment normal.         Assessment/Plan:     Diagnosis and associated orders:   1. Acute gout of right foot, unspecified cause  - predniSONE (DELTASONE) 20 MG Tab; Take 2 Tablets by mouth every day for 5 days.  Dispense: 10 Tablet; Refill: 0        Comments/MDM:   Pt is clinically stable at today's acute urgent care visit.  No acute distress noted. Appropriate for outpatient management at this time. "     Assessment & Plan  Acute gout.  History of gout with current acute episode in the right foot beginning on 04/21/2025. Previously managed with naproxen, but advised to discontinue its use. Prescribed prednisone, 2 tablets daily for 5 days. Advised to apply ice, rest, and use over-the-counter Tylenol for additional pain management if needed.            Discussed DDx, management options (risks,benefits, and alternatives to planned treatment), natural progression and supportive care.  Expressed understanding and the treatment plan was agreed upon. Questions were encouraged and answered   Return to urgent care prn if new or worsening sx or if there is no improvement in condition prn.    Educated in Red flags and indications to immediately call 911 or present to the Emergency Department.   Advised the patient to follow-up with the primary care physician for recheck, reevaluation, and consideration of further management.    I personally reviewed prior external notes and test results pertinent to today's visit.  I have independently reviewed and interpreted all diagnostics ordered during this urgent care acute visit.       Please note that this dictation was created using voice recognition software. I have made a reasonable attempt to correct obvious errors, but I expect that there are errors of grammar and possibly content that I did not discover before finalizing the note.    This note was electronically signed by DOUGLAS Shields

## 2025-04-24 NOTE — LETTER
April 24, 2025    To Whom It May Concern:         This is confirmation that Ras Trivedi Tavon attended his scheduled appointment with SHARAD Ayon on 4/24/25. Please excuse him from work 4/24/25-4/25/25.          If you have any questions please do not hesitate to call me at the phone number listed below.    Sincerely,          ANDREW AyonRJordanN.  [phone]